# Patient Record
Sex: MALE | Race: BLACK OR AFRICAN AMERICAN | NOT HISPANIC OR LATINO | URBAN - METROPOLITAN AREA
[De-identification: names, ages, dates, MRNs, and addresses within clinical notes are randomized per-mention and may not be internally consistent; named-entity substitution may affect disease eponyms.]

---

## 2019-07-24 ENCOUNTER — EMERGENCY (EMERGENCY)
Facility: HOSPITAL | Age: 34
LOS: 1 days | Discharge: HOME | End: 2019-07-24
Attending: EMERGENCY MEDICINE
Payer: SELF-PAY

## 2019-07-24 VITALS
OXYGEN SATURATION: 98 % | TEMPERATURE: 98 F | HEART RATE: 90 BPM | DIASTOLIC BLOOD PRESSURE: 81 MMHG | SYSTOLIC BLOOD PRESSURE: 132 MMHG | RESPIRATION RATE: 18 BRPM

## 2019-07-24 VITALS — WEIGHT: 240.08 LBS | HEIGHT: 78 IN

## 2019-07-24 PROCEDURE — 99283 EMERGENCY DEPT VISIT LOW MDM: CPT

## 2019-07-24 PROCEDURE — 73030 X-RAY EXAM OF SHOULDER: CPT

## 2019-07-24 PROCEDURE — 73030 X-RAY EXAM OF SHOULDER: CPT | Mod: 26,RT

## 2019-07-24 NOTE — ED ADULT TRIAGE NOTE - CHIEF COMPLAINT QUOTE
c/o right shoulder pain, heavy boxes fell on his shoulder while working, c/o unable to lift up and extend right arm and shoulder

## 2019-08-14 NOTE — ED PROVIDER NOTE - ATTENDING CONTRIBUTION TO CARE
I, Corey Saldana, have personally performed a face to face diagnostic evaluation on this patient. I have reviewed the ACP note and agree with the history, exam and plan of care, except as noted.    36 yo M p/r right shoulder pain x 2 days after a box fell on his shoulder. MIld tenerness of right shoulder but full ROM.  xray shoulder negatve for fracture.

## 2019-08-14 NOTE — ED PROVIDER NOTE - OBJECTIVE STATEMENT
32 y/o M c/o right shoulder pain x 2 days.  Patient states that some heavy boxes fell from a shelf and hit him in the shoulder.  Denies head trauma.  Patient denies loss of consciousness, nausea/vomiting, blurry vision, use of anticoagulants, difficulty walking, slurred speech, focal weaknesses, headache, dizziness, numbness, tingling, neck pain, back pain. chest pain, abdominal pain, hip pain, shortness of breath or pain in any other joints or extremities.

## 2020-01-30 ENCOUNTER — EMERGENCY (EMERGENCY)
Facility: HOSPITAL | Age: 35
LOS: 1 days | Discharge: HOME | End: 2020-01-30
Attending: EMERGENCY MEDICINE
Payer: MEDICAID

## 2020-01-30 VITALS — WEIGHT: 270.07 LBS | HEIGHT: 78 IN

## 2020-01-30 VITALS
DIASTOLIC BLOOD PRESSURE: 82 MMHG | OXYGEN SATURATION: 97 % | SYSTOLIC BLOOD PRESSURE: 161 MMHG | TEMPERATURE: 98 F | RESPIRATION RATE: 18 BRPM | HEART RATE: 120 BPM

## 2020-01-30 LAB
APPEARANCE UR: CLEAR — SIGNIFICANT CHANGE UP
BACTERIA # UR AUTO: NEGATIVE — SIGNIFICANT CHANGE UP
BILIRUB UR-MCNC: NEGATIVE — SIGNIFICANT CHANGE UP
COLOR SPEC: YELLOW — SIGNIFICANT CHANGE UP
DIFF PNL FLD: NEGATIVE — SIGNIFICANT CHANGE UP
EPI CELLS # UR: NEGATIVE — SIGNIFICANT CHANGE UP
FLU A RESULT: DETECTED
FLU A RESULT: DETECTED
FLUAV AG NPH QL: DETECTED
FLUBV AG NPH QL: SIGNIFICANT CHANGE UP
GLUCOSE UR QL: NEGATIVE MG/DL — SIGNIFICANT CHANGE UP
KETONES UR-MCNC: ABNORMAL
LEUKOCYTE ESTERASE UR-ACNC: NEGATIVE — SIGNIFICANT CHANGE UP
NITRITE UR-MCNC: NEGATIVE — SIGNIFICANT CHANGE UP
PH UR: 7 — SIGNIFICANT CHANGE UP (ref 5–8)
PROT UR-MCNC: 30 MG/DL
RBC CASTS # UR COMP ASSIST: NEGATIVE /HPF — SIGNIFICANT CHANGE UP (ref 0–4)
RSV RESULT: SIGNIFICANT CHANGE UP
RSV RNA RESP QL NAA+PROBE: SIGNIFICANT CHANGE UP
SP GR SPEC: 1.01 — SIGNIFICANT CHANGE UP (ref 1.01–1.02)
UROBILINOGEN FLD QL: 1 MG/DL
WBC UR QL: SIGNIFICANT CHANGE UP

## 2020-01-30 PROCEDURE — 71046 X-RAY EXAM CHEST 2 VIEWS: CPT | Mod: 26

## 2020-01-30 PROCEDURE — 99053 MED SERV 10PM-8AM 24 HR FAC: CPT

## 2020-01-30 PROCEDURE — 81001 URINALYSIS AUTO W/SCOPE: CPT

## 2020-01-30 PROCEDURE — 87631 RESP VIRUS 3-5 TARGETS: CPT

## 2020-01-30 PROCEDURE — 99284 EMERGENCY DEPT VISIT MOD MDM: CPT

## 2020-01-30 PROCEDURE — 99284 EMERGENCY DEPT VISIT MOD MDM: CPT | Mod: 25

## 2020-01-30 PROCEDURE — 94640 AIRWAY INHALATION TREATMENT: CPT

## 2020-01-30 PROCEDURE — 71046 X-RAY EXAM CHEST 2 VIEWS: CPT

## 2020-01-30 PROCEDURE — 87086 URINE CULTURE/COLONY COUNT: CPT

## 2020-01-30 RX ORDER — IBUPROFEN 200 MG
600 TABLET ORAL ONCE
Refills: 0 | Status: COMPLETED | OUTPATIENT
Start: 2020-01-30 | End: 2020-01-30

## 2020-01-30 RX ORDER — IPRATROPIUM/ALBUTEROL SULFATE 18-103MCG
3 AEROSOL WITH ADAPTER (GRAM) INHALATION ONCE
Refills: 0 | Status: COMPLETED | OUTPATIENT
Start: 2020-01-30 | End: 2020-01-30

## 2020-01-30 RX ORDER — ALBUTEROL 90 UG/1
2 AEROSOL, METERED ORAL
Qty: 1 | Refills: 0
Start: 2020-01-30

## 2020-01-30 RX ORDER — CYCLOBENZAPRINE HYDROCHLORIDE 10 MG/1
10 TABLET, FILM COATED ORAL ONCE
Refills: 0 | Status: COMPLETED | OUTPATIENT
Start: 2020-01-30 | End: 2020-01-30

## 2020-01-30 RX ADMIN — Medication 3 MILLILITER(S): at 08:33

## 2020-01-30 RX ADMIN — Medication 50 MILLIGRAM(S): at 08:34

## 2020-01-30 RX ADMIN — CYCLOBENZAPRINE HYDROCHLORIDE 10 MILLIGRAM(S): 10 TABLET, FILM COATED ORAL at 08:34

## 2020-01-30 RX ADMIN — Medication 75 MILLIGRAM(S): at 11:55

## 2020-01-30 RX ADMIN — Medication 600 MILLIGRAM(S): at 08:33

## 2020-01-30 NOTE — ED PROVIDER NOTE - OBJECTIVE STATEMENT
34yoM; pmh of asthma and herniated disc; p/w asthma exacerbation onset last night. Associated w/ fever, chills, lower back pain, pleuritic CP and productive cough with green sputum. He states that his sx onset with no precipitating factors. Patient has taken 3 nebulizer treatments since last night with no relief. He has never had to be admitted for his asthma in the past. Patient states that he has chronic back pain s/p a herniated disc in 07/2019, but reports this current back pain is different. He has not taken anything for pain relief. Patient has not received the flu vaccination this year. Denies sick contact or recent heavy lifting.   pmh: asthma, herniated disc.   SOCIAL: No tobacco/illicit substance use/socialEtOH 34yoM; pmh of asthma(never intubated, never admitted to hospital for asthma) and herniated disc; p/w asthma exacerbation onset last night. Associated w/ fever, chills, lower back pain, and productive cough with green sputum. denies sick contacts. denies travel. Patient has taken 3 nebulizer treatments since last night with no relief.  Patient states that he has chronic back pain s/p a herniated disc in 07/2019, but reports this current back pain is worse and began after URI sx. He has not taken anything for pain relief. Patient has not received the flu vaccination this year. Denies sick contact or recent heavy lifting.   pmh: asthma, herniated disc.   SOCIAL: No tobacco/illicit substance use/socialEtOH

## 2020-01-30 NOTE — ED PROVIDER NOTE - PHYSICAL EXAMINATION
Gen: Alert, NAD  Head: NC, AT, PERRL, EOMI, normal lids/conjunctiva  ENT: B TM WNL, normal hearing, patent oropharynx without erythema/exudate, uvula midline  Neck: +supple, no tenderness/meningismus/JVD, +Trachea midline  Pulm: occasional expiratory wheeze bilaterally. rhonchi at left lung base.  CV: RRR, no M/R/G, +dist pulses  Abd: soft, NT/ND, +BS, no hepatosplenomegaly  Mskel: no edema/erythema/cyanosis  Skin: no rash  Neuro: AAOx3, no sensory/motor deficits, CN 2-12 intact Gen: Alert, NAD  Head: NC, AT, PERRL, EOMI, normal lids/conjunctiva  ENT: B TM WNL, normal hearing, patent oropharynx without erythema/exudate, uvula midline  Neck: +supple, no tenderness/meningismus/JVD, +Trachea midline  Pulm: occasional expiratory wheeze bilaterally. rhonchi at left lung base.  CV: RRR, no M/R/G, +dist pulses  Abd: soft, NT/ND, +BS, no hepatosplenomegaly  Mskel: no edema/erythema/cyanosis  Skin: no rash  Neuro: gross intact

## 2020-01-30 NOTE — ED PROVIDER NOTE - NS ED ROS FT
Constitutional: (+) fever  (+)chills  (-)sweats  Eyes/ENT: (-) blurry vision, (-) epistaxis  (-)rhinorrhea   (-) sore throat    Cardiovascular: (-) chest pain, (-) palpitations (-) edema   Respiratory: (+) cough, (+) Asthma exacerbation  Gastrointestinal: (-)nausea  (-)vomiting, (-) diarrhea  (-) abdominal pain   :  (-)dysuria, (-)frequency, (-)urgency, (-)hematuria  Musculoskeletal: (-) neck pain, (+) back pain, (-) joint pain  Integumentary: (-) rash, (-) edema  Neurological: (-) headache, (-) altered mental status  (-)LOC Constitutional: (+) fever  (+)chills  (-)sweats  Eyes/ENT:   (+) sore throat    Cardiovascular: (-) chest pain, (-) palpitations (-) edema   Respiratory: (+) cough, (+)sob  Gastrointestinal: (-)nausea  (-)vomiting, (-) diarrhea  (-) abdominal pain   :  (-)dysuria, (-)frequency, (-)urgency, (-)hematuria  Musculoskeletal: (-) neck pain, (+) back pain, (-) joint pain  Integumentary: (-) rash  Neurological: (-) headache,

## 2020-01-30 NOTE — ED PROVIDER NOTE - PATIENT PORTAL LINK FT
You can access the FollowMyHealth Patient Portal offered by [facility] by registering at the following website: [email or web address]. By joining [email or web address]’s FollowMyHealth portal, you will also be able to view your health information using other applications (apps) compatible with our system.

## 2020-01-30 NOTE — ED PROVIDER NOTE - NSFOLLOWUPINSTRUCTIONS_ED_ALL_ED_FT
take medications as prescribed  Take Motrin as needed for pain or fever every 6 hours  Rest, drink plenty of fluids  Follow up with your primary care doctor  AVOID CONTACT WITH PREGNANT WOMEN AND CHILDREN UNTIL FEVER FREE X 24 HOURS

## 2020-01-30 NOTE — ED ADULT TRIAGE NOTE - CHIEF COMPLAINT QUOTE
Pt with hx of asthma presents with c/o fever, diff breathing, nausea, vomiting starting last night. Pt also mentions his chronic lower back pain has been bothering him.

## 2020-01-30 NOTE — ED PROVIDER NOTE - PROGRESS NOTE DETAILS
Pt evaluated by intake physician. HPI/PE/ROS as noted above. Patient reports improvement in symptoms, lungs CTA. Flu A positive, pt to be place don tamiflu and sent home with f/u

## 2020-01-30 NOTE — ED ADULT NURSE NOTE - NSIMPLEMENTINTERV_GEN_ALL_ED
Implemented All Universal Safety Interventions:  [place] to call system. Call bell, personal items and telephone within reach. Instruct patient to call for assistance. Room bathroom lighting operational. Non-slip footwear when patient is off stretcher. Physically safe environment: no spills, clutter or unnecessary equipment. Stretcher in lowest position, wheels locked, appropriate side rails in place.

## 2020-01-31 LAB
CULTURE RESULTS: NO GROWTH — SIGNIFICANT CHANGE UP
SPECIMEN SOURCE: SIGNIFICANT CHANGE UP

## 2022-08-23 NOTE — ED ADULT NURSE NOTE - PAIN: BODY LOCATION
back [facility] Pregnancy And Lactation Text: This medication is Pregnancy Category C and it isn't known if it is safe during pregnancy. It is unknown if it is excreted in breast milk.

## 2023-11-08 ENCOUNTER — NON-APPOINTMENT (OUTPATIENT)
Age: 38
End: 2023-11-08

## 2023-11-20 ENCOUNTER — APPOINTMENT (OUTPATIENT)
Dept: BREAST CENTER | Facility: CLINIC | Age: 38
End: 2023-11-20

## 2025-02-07 NOTE — ED PROVIDER NOTE - ATTESTATION, MLM
I have reviewed and confirmed nurses' notes for patient's medications, allergies, medical history, and surgical history. Plan -ekg labs ct meds reassess

## 2025-03-14 ENCOUNTER — NON-APPOINTMENT (OUTPATIENT)
Age: 40
End: 2025-03-14

## 2025-03-14 ENCOUNTER — APPOINTMENT (OUTPATIENT)
Dept: CARDIOLOGY | Facility: CLINIC | Age: 40
End: 2025-03-14
Payer: MEDICAID

## 2025-03-14 VITALS
BODY MASS INDEX: 28.46 KG/M2 | SYSTOLIC BLOOD PRESSURE: 216 MMHG | OXYGEN SATURATION: 99 % | HEIGHT: 78 IN | DIASTOLIC BLOOD PRESSURE: 102 MMHG | HEART RATE: 117 BPM | WEIGHT: 246 LBS

## 2025-03-14 VITALS — DIASTOLIC BLOOD PRESSURE: 100 MMHG | SYSTOLIC BLOOD PRESSURE: 170 MMHG

## 2025-03-14 DIAGNOSIS — R07.89 OTHER CHEST PAIN: ICD-10-CM

## 2025-03-14 DIAGNOSIS — I10 ESSENTIAL (PRIMARY) HYPERTENSION: ICD-10-CM

## 2025-03-14 DIAGNOSIS — E78.5 HYPERLIPIDEMIA, UNSPECIFIED: ICD-10-CM

## 2025-03-14 DIAGNOSIS — I45.10 UNSPECIFIED RIGHT BUNDLE-BRANCH BLOCK: ICD-10-CM

## 2025-03-14 DIAGNOSIS — R00.0 TACHYCARDIA, UNSPECIFIED: ICD-10-CM

## 2025-03-14 PROCEDURE — 93000 ELECTROCARDIOGRAM COMPLETE: CPT

## 2025-03-14 PROCEDURE — 99204 OFFICE O/P NEW MOD 45 MIN: CPT | Mod: 25

## 2025-03-14 RX ORDER — ALBUTEROL 90 MCG
90 AEROSOL (GRAM) INHALATION
Refills: 0 | Status: ACTIVE | COMMUNITY

## 2025-03-14 RX ORDER — LOSARTAN POTASSIUM 50 MG/1
50 TABLET, FILM COATED ORAL
Qty: 30 | Refills: 3 | Status: ACTIVE | COMMUNITY
Start: 2025-03-14 | End: 1900-01-01

## 2025-03-14 RX ORDER — AMLODIPINE BESYLATE 10 MG/1
10 TABLET ORAL DAILY
Refills: 0 | Status: ACTIVE | COMMUNITY

## 2025-03-31 ENCOUNTER — APPOINTMENT (OUTPATIENT)
Dept: GASTROENTEROLOGY | Facility: CLINIC | Age: 40
End: 2025-03-31

## 2025-04-01 ENCOUNTER — APPOINTMENT (OUTPATIENT)
Dept: CARDIOLOGY | Facility: CLINIC | Age: 40
End: 2025-04-01
Payer: MEDICAID

## 2025-04-01 PROCEDURE — 93306 TTE W/DOPPLER COMPLETE: CPT

## 2025-04-02 ENCOUNTER — APPOINTMENT (OUTPATIENT)
Dept: CARDIOLOGY | Facility: CLINIC | Age: 40
End: 2025-04-02
Payer: MEDICAID

## 2025-04-02 VITALS
BODY MASS INDEX: 28 KG/M2 | HEART RATE: 115 BPM | HEIGHT: 78 IN | SYSTOLIC BLOOD PRESSURE: 210 MMHG | WEIGHT: 242 LBS | DIASTOLIC BLOOD PRESSURE: 102 MMHG | OXYGEN SATURATION: 99 %

## 2025-04-02 VITALS — SYSTOLIC BLOOD PRESSURE: 144 MMHG | DIASTOLIC BLOOD PRESSURE: 93 MMHG

## 2025-04-02 DIAGNOSIS — R07.89 OTHER CHEST PAIN: ICD-10-CM

## 2025-04-02 DIAGNOSIS — I45.10 UNSPECIFIED RIGHT BUNDLE-BRANCH BLOCK: ICD-10-CM

## 2025-04-02 DIAGNOSIS — E78.5 HYPERLIPIDEMIA, UNSPECIFIED: ICD-10-CM

## 2025-04-02 DIAGNOSIS — R00.0 TACHYCARDIA, UNSPECIFIED: ICD-10-CM

## 2025-04-02 DIAGNOSIS — I10 ESSENTIAL (PRIMARY) HYPERTENSION: ICD-10-CM

## 2025-04-02 PROCEDURE — 99214 OFFICE O/P EST MOD 30 MIN: CPT

## 2025-04-02 PROCEDURE — G2211 COMPLEX E/M VISIT ADD ON: CPT | Mod: NC

## 2025-06-04 ENCOUNTER — APPOINTMENT (OUTPATIENT)
Dept: CARDIOLOGY | Facility: CLINIC | Age: 40
End: 2025-06-04

## 2025-07-02 ENCOUNTER — APPOINTMENT (OUTPATIENT)
Dept: CARDIOLOGY | Facility: CLINIC | Age: 40
End: 2025-07-02

## 2025-07-09 ENCOUNTER — APPOINTMENT (OUTPATIENT)
Dept: CARDIOLOGY | Facility: CLINIC | Age: 40
End: 2025-07-09
Payer: MEDICAID

## 2025-07-09 VITALS
BODY MASS INDEX: 27.77 KG/M2 | HEIGHT: 78 IN | DIASTOLIC BLOOD PRESSURE: 90 MMHG | SYSTOLIC BLOOD PRESSURE: 172 MMHG | WEIGHT: 240 LBS | HEART RATE: 120 BPM | OXYGEN SATURATION: 99 %

## 2025-07-09 VITALS — DIASTOLIC BLOOD PRESSURE: 82 MMHG | SYSTOLIC BLOOD PRESSURE: 140 MMHG

## 2025-07-09 PROCEDURE — 99214 OFFICE O/P EST MOD 30 MIN: CPT | Mod: 25

## 2025-07-09 PROCEDURE — 93000 ELECTROCARDIOGRAM COMPLETE: CPT

## 2025-07-09 RX ORDER — MONTELUKAST SODIUM 10 MG/1
10 TABLET, FILM COATED ORAL
Refills: 0 | Status: ACTIVE | COMMUNITY

## 2025-08-08 ENCOUNTER — OUTPATIENT (OUTPATIENT)
Dept: OUTPATIENT SERVICES | Facility: HOSPITAL | Age: 40
LOS: 1 days | End: 2025-08-08
Payer: COMMERCIAL

## 2025-08-08 DIAGNOSIS — G47.33 OBSTRUCTIVE SLEEP APNEA (ADULT) (PEDIATRIC): ICD-10-CM

## 2025-08-08 PROCEDURE — 95800 SLP STDY UNATTENDED: CPT | Mod: 26

## 2025-08-08 PROCEDURE — 95800 SLP STDY UNATTENDED: CPT

## 2025-08-26 ENCOUNTER — NON-APPOINTMENT (OUTPATIENT)
Age: 40
End: 2025-08-26

## 2025-09-08 ENCOUNTER — APPOINTMENT (OUTPATIENT)
Dept: CARDIOLOGY | Facility: CLINIC | Age: 40
End: 2025-09-08
Payer: MEDICAID

## 2025-09-08 DIAGNOSIS — R07.89 OTHER CHEST PAIN: ICD-10-CM

## 2025-09-08 PROCEDURE — 93015 CV STRESS TEST SUPVJ I&R: CPT

## 2025-09-16 ENCOUNTER — APPOINTMENT (OUTPATIENT)
Dept: CT IMAGING | Facility: CLINIC | Age: 40
End: 2025-09-16
Payer: SELF-PAY

## 2025-09-16 PROCEDURE — 75571 CT HRT W/O DYE W/CA TEST: CPT | Mod: 26

## 2025-09-17 ENCOUNTER — APPOINTMENT (OUTPATIENT)
Dept: CARDIOLOGY | Facility: CLINIC | Age: 40
End: 2025-09-17
Payer: MEDICAID

## 2025-09-17 VITALS
HEIGHT: 78 IN | OXYGEN SATURATION: 98 % | BODY MASS INDEX: 28.35 KG/M2 | WEIGHT: 245 LBS | DIASTOLIC BLOOD PRESSURE: 70 MMHG | HEART RATE: 107 BPM | SYSTOLIC BLOOD PRESSURE: 130 MMHG

## 2025-09-17 DIAGNOSIS — I10 ESSENTIAL (PRIMARY) HYPERTENSION: ICD-10-CM

## 2025-09-17 DIAGNOSIS — I45.10 UNSPECIFIED RIGHT BUNDLE-BRANCH BLOCK: ICD-10-CM

## 2025-09-17 DIAGNOSIS — E78.5 HYPERLIPIDEMIA, UNSPECIFIED: ICD-10-CM

## 2025-09-17 PROCEDURE — 99214 OFFICE O/P EST MOD 30 MIN: CPT | Mod: 25

## 2025-09-17 PROCEDURE — 93000 ELECTROCARDIOGRAM COMPLETE: CPT
